# Patient Record
Sex: MALE | Race: WHITE | NOT HISPANIC OR LATINO | URBAN - METROPOLITAN AREA
[De-identification: names, ages, dates, MRNs, and addresses within clinical notes are randomized per-mention and may not be internally consistent; named-entity substitution may affect disease eponyms.]

---

## 2023-11-10 ENCOUNTER — OFFICE VISIT (OUTPATIENT)
Dept: URGENT CARE | Facility: CLINIC | Age: 48
End: 2023-11-10
Payer: COMMERCIAL

## 2023-11-10 VITALS
HEART RATE: 77 BPM | SYSTOLIC BLOOD PRESSURE: 141 MMHG | TEMPERATURE: 98 F | DIASTOLIC BLOOD PRESSURE: 90 MMHG | RESPIRATION RATE: 16 BRPM

## 2023-11-10 DIAGNOSIS — S91.111A LACERATION OF RIGHT GREAT TOE WITHOUT FOREIGN BODY PRESENT OR DAMAGE TO NAIL, INITIAL ENCOUNTER: Primary | ICD-10-CM

## 2023-11-10 PROCEDURE — 99204 OFFICE O/P NEW MOD 45 MIN: CPT | Performed by: PHYSICIAN ASSISTANT

## 2023-11-10 PROCEDURE — 90471 IMMUNIZATION ADMIN: CPT | Performed by: PHYSICIAN ASSISTANT

## 2023-11-10 PROCEDURE — 90715 TDAP VACCINE 7 YRS/> IM: CPT | Performed by: PHYSICIAN ASSISTANT

## 2023-11-10 RX ORDER — CEPHALEXIN 500 MG/1
500 CAPSULE ORAL 2 TIMES DAILY
Qty: 14 CAPSULE | Refills: 0 | Status: SHIPPED | OUTPATIENT
Start: 2023-11-10 | End: 2023-11-17

## 2023-11-10 RX ORDER — FENOFIBRATE 48 MG/1
48 TABLET, FILM COATED ORAL DAILY
COMMUNITY

## 2023-11-10 RX ORDER — LISINOPRIL 10 MG/1
10 TABLET ORAL DAILY
COMMUNITY

## 2023-11-10 ASSESSMENT — ENCOUNTER SYMPTOMS
GASTROINTESTINAL NEGATIVE: 1
PSYCHIATRIC NEGATIVE: 1
CARDIOVASCULAR NEGATIVE: 1
WEAKNESS: 0
ENDOCRINE NEGATIVE: 1
EYES NEGATIVE: 1
RESPIRATORY NEGATIVE: 1
ALLERGIC/IMMUNOLOGIC NEGATIVE: 1
HEMATOLOGIC/LYMPHATIC NEGATIVE: 1
NUMBNESS: 0
ARTHRALGIAS: 0
WOUND: 1
CONSTITUTIONAL NEGATIVE: 1

## 2023-11-10 ASSESSMENT — PAIN SCALES - GENERAL: PAINLEVEL: 9

## 2023-11-11 NOTE — PATIENT INSTRUCTIONS
Keep wound site clean and dry  Wash wound with mild soap and water daily  Bacitracin and dry dressing daily  Pcp follow up next week for wound check  Suture removal in 10-14 days  ER visit anytime 24/7 for acute worsening or changing condition

## 2023-11-11 NOTE — PROGRESS NOTES
Subjective   Patient ID: Daren Flannery is a 47 y.o. male.      History provided by:  Patient   used: No      This is a 47 yr old male here for right great toe laceration that occurred this evening. Pt grinding and metal piece fell and caused right great toe laceration. Metal Went through top of pts shoe. No difficulty with toe ROM or parasthesias/weakness. Last tetanus unknown.        Review of Systems   Constitutional: Negative.    HENT: Negative.     Eyes: Negative.    Respiratory: Negative.     Cardiovascular: Negative.    Gastrointestinal: Negative.    Endocrine: Negative.    Genitourinary: Negative.    Musculoskeletal:  Negative for arthralgias.   Skin:  Positive for wound.   Allergic/Immunologic: Negative.    Neurological:  Negative for weakness and numbness.   Hematological: Negative.    Psychiatric/Behavioral: Negative.     All other systems reviewed and are negative.  No past medical history on file.  Current Outpatient Medications on File Prior to Visit   Medication Sig Dispense Refill    fenofibrate (Tricor) 48 mg tablet Take 1 tablet (48 mg) by mouth once daily.      lisinopril 10 mg tablet Take 1 tablet (10 mg) by mouth once daily.       No current facility-administered medications on file prior to visit.     No Known Allergies  /90   Pulse 77   Temp 36.7 °C (98 °F) (Temporal)   Resp 16   Objective   Physical Exam  Vitals and nursing note reviewed.   Constitutional:       Appearance: Normal appearance.   HENT:      Head: Normocephalic and atraumatic.   Cardiovascular:      Rate and Rhythm: Normal rate and regular rhythm.   Pulmonary:      Effort: Pulmonary effort is normal.      Breath sounds: Normal breath sounds.   Musculoskeletal:         General: Signs of injury present. No deformity.   Skin:     General: Skin is warm and dry.      Comments: 1.5 cm laceration dorsal surface right great toe, distal motor, n-v intact    Neurological:      General: No focal deficit  present.      Mental Status: He is alert and oriented to person, place, and time.   Psychiatric:         Mood and Affect: Mood normal.         Behavior: Behavior normal.     Procedure right great toe laceration repair-1% plain lidocaine local anesthesia placed. Betadine and sterile saline lavaged wound. No FB seen. Sterile draping placed. 3 4-0 nylon simple interrupted sutures placed. Hemostasis achieved. Pt tolerated procedure well. Bacitracin, dry dressing applied by RN    Assessment:  Right great toe laceration    Plan:  Keflex 500 mg bid x 1 week  Tdap updated today  Wash wound with mild soap and water daily  Bacitracin/dry dressing daily  Start to keep wound open to the air in 2-3 days when it can be kept clean and dry  SR in 10-14 days  Pcp wound check early next week  ER visit anytime 24/7 for acute worsening or changing condition

## 2025-05-20 LAB
NON-UH HIE ALB: 4.4 G/DL (ref 3.4–5)
NON-UH HIE ALK PHOS: 40 UNIT/L (ref 45–117)
NON-UH HIE BILIRUBIN, DIRECT: 0.29 MG/DL (ref 0–0.4)
NON-UH HIE BILIRUBIN, TOTAL: 0.9 MG/DL (ref 0.3–1.2)
NON-UH HIE CALCULATED LDL CHOLESTEROL: 96 MG/DL (ref 60–130)
NON-UH HIE CHOLESTEROL: 168 MG/DL (ref 100–200)
NON-UH HIE GOT: 34 UNIT/L (ref 15–37)
NON-UH HIE GPT: 36 UNIT/L (ref 10–49)
NON-UH HIE HDL CHOLESTEROL: 60 MG/DL (ref 40–60)
NON-UH HIE HGB A1C: 5 %
NON-UH HIE TOTAL CHOL/HDL CHOL RATIO: 2.8
NON-UH HIE TOTAL PROTEIN: 7.2 G/DL (ref 5.7–8.2)
NON-UH HIE TRIGLYCERIDES: 61 MG/DL (ref 30–150)

## 2025-08-19 LAB
NON-UH HIE ALB: 4.4 G/DL (ref 3.4–5)
NON-UH HIE ALK PHOS: 40 UNIT/L (ref 45–117)
NON-UH HIE BILIRUBIN, DIRECT: 0.22 MG/DL (ref 0–0.4)
NON-UH HIE BILIRUBIN, TOTAL: 0.7 MG/DL (ref 0.3–1.2)
NON-UH HIE CALCULATED LDL CHOLESTEROL: 117 MG/DL (ref 60–130)
NON-UH HIE CHOLESTEROL: 189 MG/DL (ref 100–200)
NON-UH HIE GOT: 34 UNIT/L (ref 15–37)
NON-UH HIE GPT: 40 UNIT/L (ref 10–49)
NON-UH HIE HDL CHOLESTEROL: 57 MG/DL (ref 40–60)
NON-UH HIE HGB A1C: 5 %
NON-UH HIE TOTAL CHOL/HDL CHOL RATIO: 3.3
NON-UH HIE TOTAL PROTEIN: 7 G/DL (ref 5.7–8.2)
NON-UH HIE TRIGLYCERIDES: 76 MG/DL (ref 30–150)